# Patient Record
(demographics unavailable — no encounter records)

---

## 2018-09-23 NOTE — ER REPORT
History and Physical


Time Seen By MD:  16:37


Hx. of Stated Complaint:  


C/O dysuria and frequency starting this a.m. with associated chills. Prior 


history of "kidney infection" in July requiring hospitalization.


HPI/ROS


CHIEF COMPLAINT: UTI symptoms





HISTORY OF PRESENT ILLNESS: This is a 21-year-old female presents to the 


emergency department for urinary tract infection symptoms. Patient states he was


here in July, waited "too long" for her UTI symptoms and ended up being admitted


to the hospital with a pyelonephritis. Patient states that she didn't want to 


wait that long, states that she woke this morning with urinary frequency, 


urgency and hematuria. Patient states progressively getting worse over the 


course of day. Denies flank pain. Denies nausea or vomiting. No other 


complaints. No fevers or chills.





REVIEW OF SYSTEMS:


Respiratory: No cough, no dyspnea.


Cardiovascular: No chest pain, no palpitations.


Gastrointestinal: No vomiting, no abdominal pain.


Musculoskeletal: No back pain.


Genitourinary: As above.


Allergies:  


Coded Allergies:  


     No Known Drug Allergies (Unverified , 9/23/18)


Home Meds


Active Scripts


Nitrofurantoin Monohyd/M-Cryst (MACROBID 100 MG CAPSULE) 100 Mg Capsule, 100 MG 


PO BID for 5 Days, #10 CAPSULE 0 Refills


   Prov:SHERRI BARAKAT FNP-BC         9/23/18


Reported Medications


Sertraline Hcl (SERTRALINE HCL) 100 Mg Tablet, 1 TAB PO QDAY, TAB


   9/23/18


Past Medical/Surgical History


The patient has a past medical and surgical history of schizophrenia, anorexia, 


anxiety and depression. Urinary tract infections, pyelonephritis.


Reviewed Nurses Notes:  Yes


Constitutional





Vital Sign - Last 24 Hours








 9/23/18 9/23/18 9/23/18 9/23/18





 16:23 16:28 16:29 16:30


 


Temp    97.4


 


Pulse ??? ???  64


 


Resp    16


 


B/P (MAP)   124/86 (99) 124/86


 


Pulse Ox    97


 


O2 Delivery    Room Air


 


    





 9/23/18 9/23/18 9/23/18 9/23/18





 16:33 16:38 16:43 16:48


 


Pulse 68 67 71 61


 


Pulse Ox 98 98 98 95





 9/23/18 9/23/18 9/23/18 9/23/18





 16:53 16:58 17:00 17:03


 


Pulse 61 75  77


 


B/P (MAP)   109/80 (90) 


 


Pulse Ox 97 96  96








Physical Exam


  General Appearance: The patient is alert, has no immediate need for airway 


protection and no current signs of toxicity.  


Eyes: Pupils equal and round no injection.


Respiratory: Chest is non tender, lungs are clear to auscultation.


Cardiac: regular rate and rhythm.


Gastrointestinal: Abdomen is soft and non tender, no masses, bowel sounds 


normal.


Musculoskeletal:  Neck: Neck is supple and non tender.


   Extremities have full range of motion and are non tender.


Skin: No rashes or lesions.





DIFFERENTIAL DIAGNOSIS: After history and physical exam differential diagnosis 


was considered for abdominal pain in a female including but not limited to 


ovarian cyst, pelvic inflammatory disease, ovarian torsion, urinary tract 


infection, and appendicitis.





Medical Decision Making


Data Points


Laboratory





Hematology








Test


 9/23/18


16:38


 


Urine Color Yellow 


 


Urine Clarity


 Slightly-cloudy





 


Urine pH


 7.0 pH


(4.8-9.5)


 


Urine Specific Gravity 1.006 


 


Urine Protein


 100 mg/dL


(NEGATIVE)


 


Urine Glucose (UA)


 Negative mg/dL


(NEGATIVE)


 


Urine Ketones


 Negative mg/dL


(NEGATIVE)


 


Urine Blood


 Large


(NEGATIVE)


 


Urine Nitrite


 Negative


(NEGATIVE)


 


Urine Bilirubin


 Negative


(NEGATIVE)


 


Urine Urobilinogen


 Negative mg/dL


(0.2-1.9)


 


Urine Leukocyte Esterase


 Large


(NEGATIVE)


 


Urine RBC


 284 /HPF


(0-2/HPF)


 


Urine WBC


 220 /HPF


(0-5/HPF)


 


Urine WBC Clumps Few /HPF 


 


Urine Squamous Epithelial


Cells Many /LPF


(</=FEW)


 


Urine Bacteria


 Few /HPF


(NONE-FEW)


 


Urine Mucus


 Few /HPF


(NONE-FEW)


 


Urine HCG, Qualitative


 Negative


(NEGATIVE)








Chemistry








Test


 9/23/18


16:38


 


Urine Color Yellow 


 


Urine Clarity


 Slightly-cloudy





 


Urine pH


 7.0 pH


(4.8-9.5)


 


Urine Specific Gravity 1.006 


 


Urine Protein


 100 mg/dL


(NEGATIVE)


 


Urine Glucose (UA)


 Negative mg/dL


(NEGATIVE)


 


Urine Ketones


 Negative mg/dL


(NEGATIVE)


 


Urine Blood


 Large


(NEGATIVE)


 


Urine Nitrite


 Negative


(NEGATIVE)


 


Urine Bilirubin


 Negative


(NEGATIVE)


 


Urine Urobilinogen


 Negative mg/dL


(0.2-1.9)


 


Urine Leukocyte Esterase


 Large


(NEGATIVE)


 


Urine RBC


 284 /HPF


(0-2/HPF)


 


Urine WBC


 220 /HPF


(0-5/HPF)


 


Urine WBC Clumps Few /HPF 


 


Urine Squamous Epithelial


Cells Many /LPF


(</=FEW)


 


Urine Bacteria


 Few /HPF


(NONE-FEW)


 


Urine Mucus


 Few /HPF


(NONE-FEW)


 


Urine HCG, Qualitative


 Negative


(NEGATIVE)








Urinalysis








Test


 9/23/18


16:38


 


Urine Color Yellow 


 


Urine Clarity


 Slightly-cloudy





 


Urine pH


 7.0 pH


(4.8-9.5)


 


Urine Specific Gravity 1.006 


 


Urine Protein


 100 mg/dL


(NEGATIVE)


 


Urine Glucose (UA)


 Negative mg/dL


(NEGATIVE)


 


Urine Ketones


 Negative mg/dL


(NEGATIVE)


 


Urine Blood


 Large


(NEGATIVE)


 


Urine Nitrite


 Negative


(NEGATIVE)


 


Urine Bilirubin


 Negative


(NEGATIVE)


 


Urine Urobilinogen


 Negative mg/dL


(0.2-1.9)


 


Urine Leukocyte Esterase


 Large


(NEGATIVE)


 


Urine RBC


 284 /HPF


(0-2/HPF)


 


Urine WBC


 220 /HPF


(0-5/HPF)


 


Urine WBC Clumps Few /HPF 


 


Urine Squamous Epithelial


Cells Many /LPF


(</=FEW)


 


Urine Bacteria


 Few /HPF


(NONE-FEW)


 


Urine Mucus


 Few /HPF


(NONE-FEW)


 


Urine HCG, Qualitative


 Negative


(NEGATIVE)











ED Course/Re-evaluation


ED Course


The patient was admitted to a room. A history and physical obtained. 


Differential diagnoses were considered. A UA was collected, showing Large blood,


large leukocyte esterase, large urine blood cells. Negative hCG. The UA was sent


for culture, the patient did have some resistance apparently to antibiotics when


she was admitted for pyelonephritis however she is unsure to what medications. 


Patient was started on Macrobid and instructed to monitor very closely, should 


she develop any other worsening symptoms return to the ER immediately. Patient 


exposed understanding Questions or concerns and was discharged home.


Decision to Disposition Date:  Sep 23, 2018


Decision to Disposition Time:  16:55





Depart


Departure


Latest Vital Signs





Vital Signs








  Date Time  Temp Pulse Resp B/P (MAP) Pulse Ox O2 Delivery O2 Flow Rate FiO2


 


9/23/18 17:03  77   96   


 


9/23/18 17:00    109/80 (90)    


 


9/23/18 16:30 97.4  16   Room Air  








Impression:  


   Primary Impression:  


   Urinary tract infection


Condition:  Improved


Disposition:  HOME OR SELF-CARE


New Scripts


Nitrofurantoin Monohyd/M-Cryst (MACROBID 100 MG CAPSULE) 100 Mg Capsule


100 MG PO BID for 5 Days, #10 CAPSULE 0 Refills


   Prov: SONDGEROTH,SHERRI L FNP-BC         9/23/18


Patient Instructions:  Urinary Tract Infection in Women (ED)





Additional Instructions:  


Be sure to take the Macrobid as prescribed.


Drink plenty of water.


Get plenty of rest.


Take Ibuprofen or Tylenol as needed for pain.


Return to the ED for any other concerns or worsening symptoms.





Problem Qualifiers








   Primary Impression:  


   Urinary tract infection


   Urinary tract infection type:  acute cystitis  Hematuria presence:  with 


   hematuria  Qualified Codes:  N30.01 - Acute cystitis with hematuria








SHERRI BARAKAT-BC      Sep 23, 2018 16:41